# Patient Record
Sex: MALE | Race: BLACK OR AFRICAN AMERICAN | URBAN - METROPOLITAN AREA
[De-identification: names, ages, dates, MRNs, and addresses within clinical notes are randomized per-mention and may not be internally consistent; named-entity substitution may affect disease eponyms.]

---

## 2017-06-22 ENCOUNTER — OFFICE VISIT (OUTPATIENT)
Dept: CARDIOLOGY CLINIC | Age: 32
End: 2017-06-22

## 2017-06-22 VITALS
HEART RATE: 72 BPM | WEIGHT: 208.4 LBS | HEIGHT: 71 IN | DIASTOLIC BLOOD PRESSURE: 80 MMHG | SYSTOLIC BLOOD PRESSURE: 112 MMHG | BODY MASS INDEX: 29.18 KG/M2

## 2017-06-22 DIAGNOSIS — R00.2 PALPITATIONS: Primary | ICD-10-CM

## 2017-06-22 NOTE — PROGRESS NOTES
[facility]: Lj Wu  [phone]    History of Present Illness:   Mr. Heidi Cardenas is a 33 yo M referred by Dr. Leena Johns for cardiac evaluation. He is here due to recent palpitations. They have been occurring over the last few months with sensation of \"hard heart beats\" and felt like his heart was \"jumping\". At times, he would have some lightheadedness, dizziness, no syncope. His breathing has been normal and he denies any chest discomfort, other than when he feels these. They had been occurring three to four times a week, but this past month has been much more inconsistent. He last felt this two weeks ago. He denies any prior cardiac history. He is compensated on exam with clear lungs and no lower extremity edema. His EKG was sinus bradycardia, heart rate of 57 and nonspecific ST-T wave abnormality. Soc hx. No tobacco  Fam hx. No early CAD  Assessment and Plan:   1. Palpitations. These are inconsistent. Could be benign ectopy, but would have him wear a Holter or event monitor at some point if these recurred and happened at least once a week. He is agreeable with this plan and answered his questions. 2. Bradycardia. He is asymptomatic with this and is likely a normal variant. He  has no past medical history on file. All other systems negative except as above. PE  Vitals:    06/22/17 1110   BP: 112/80   Pulse: 72   Weight: 208 lb 6.4 oz (94.5 kg)   Height: 5' 11\" (1.803 m)    Body mass index is 29.07 kg/(m^2).    General appearance - alert, well appearing, and in no distress  Mental status - affect appropriate to mood  Eyes - sclera anicteric, moist mucous membranes  Neck - supple, no JVD  Chest - clear to auscultation, no wheezes, rales or rhonchi  Heart - normal rate, regular rhythm, normal S1, S2, no murmurs, rubs, clicks or gallops  Abdomen - soft, nontender, nondistended, no masses or organomegaly  Neurological -  no focal deficit  Extremities - peripheral pulses normal, no pedal edema    Recent Labs:  No results found for: CHOL, CHOLX, CHLST, CHOLV, [id number], HDL, LDL, LDLC, DLDLP, TGLX, TRIGL, TRIGP, CHHD, CHHDX  No results found for: ANA CRISTINA, CREAPOC, MCREA, ACREA, CREA, REFC3, REFC4  No results found for: BUN, BUNPOC, IBUN, MBUNV, BUNV  No results found for: K, KI, PLK, WBK  No results found for: HBA1C, HGBE8, AWB9ARSU  No results found for: HGBPOC, HGB, HGBP, HGBEXT  No results found for: PLT, PLTEXT    Reviewed:  No past medical history on file. History   Smoking Status    Not on file   Smokeless Tobacco    Not on file     History   Alcohol use Not on file     No Known Allergies    No current outpatient prescriptions on file. No current facility-administered medications for this visit.         Adore Andrea MD  [facility]  [facility] [place] [address]

## 2017-06-22 NOTE — LETTER
6/23/2017 3:51 PM 
 
Patient:  Vaughan Dakin. YOB: 1985 Date of Visit: 6/22/2017 Dear Cary Blue MD 
[address] [place] [id number] VIA Facsimile: [phone] 
 : 
 
 
Thank you for referring Mr. Jesse Boyle to me for evaluation/treatment. Below are the relevant portions of my assessment and plan of care. Mr. Akilah De La Paz is a 33 yo M referred by Dr. Aditi Sorensen for cardiac evaluation. He is here due to recent palpitations. They have been occurring over the last few months with sensation of \"hard heart beats\" and felt like his heart was \"jumping\". At times, he would have some lightheadedness, dizziness, no syncope. His breathing has been normal and he denies any chest discomfort, other than when he feels these. They had been occurring three to four times a week, but this past month has been much more inconsistent. He last felt this two weeks ago. He denies any prior cardiac history. He is compensated on exam with clear lungs and no lower extremity edema. His EKG was sinus bradycardia, heart rate of 57 and nonspecific ST-T wave abnormality. Soc hx. No tobacco 
Fam hx. No early CAD Assessment and Plan: 1. Palpitations. These are inconsistent. Could be benign ectopy, but would have him wear a Holter or event monitor at some point if these recurred and happened at least once a week. He is agreeable with this plan and answered his questions. 2. Bradycardia. He is asymptomatic with this and is likely a normal variant. If you have questions, please do not hesitate to call me. Sincerely, Keena Robledo MD